# Patient Record
Sex: MALE | Race: WHITE | NOT HISPANIC OR LATINO | URBAN - METROPOLITAN AREA
[De-identification: names, ages, dates, MRNs, and addresses within clinical notes are randomized per-mention and may not be internally consistent; named-entity substitution may affect disease eponyms.]

---

## 2024-11-23 ASSESSMENT — ENCOUNTER SYMPTOMS
COUGH: 0
PALPITATIONS: 0
EYE DISCHARGE: 0
FEVER: 0
HEADACHES: 0
MYALGIAS: 0
DIARRHEA: 0
ABDOMINAL PAIN: 0
FATIGUE: 0
BLOOD IN STOOL: 0
NERVOUS/ANXIOUS: 0
WHEEZING: 0
FREQUENCY: 0
EYE REDNESS: 0
BRUISES/BLEEDS EASILY: 0
SHORTNESS OF BREATH: 0
RHINORRHEA: 0
CONSTIPATION: 0
POLYDIPSIA: 0
NAUSEA: 0
HEMATURIA: 0
SORE THROAT: 0
DYSURIA: 0
DYSPHORIC MOOD: 0
DIZZINESS: 0
ADENOPATHY: 0
POLYPHAGIA: 0
CHILLS: 0
ARTHRALGIAS: 0
VOMITING: 0

## 2024-11-24 NOTE — PROGRESS NOTES
"Subjective   Patient ID: Olivier Desir is a 34 y.o. male who presents for Annual Exam.    new pt-[facility]-last pcp, gi, others? Have not seen a doctor in 10+ years.,     Any forms to fill out? No   last physical- 10 years   last labs- cannot recall   is pt fasting? yes  Does pt want a flu shot? No   Last tdap-2018   Does pt still smoke? Vapes   If no when did he quit smoking?  10 years ago   When did he start smoking? 13 years old   Any other questions or concerns that you want to discuss today?   Deviated septum      Has a cleft lip/palate surgery when younger  Family history form    No care team member to display    HPI  Last labs->1yr  Due for labs- all    No results found for: \"CHOL\"  No results found for: \"TRIG\"  No results found for: \"HDL\"  No results found for: \"LDL\"  No results found for: \"TSH\"  No results found for: \"A1C\"  No components found for: \"POCA1C\"  No results found for: \"ALBUR\"  No components found for: \"POCALBUR\"      Other concerns: deviated septum-dry air gives open wounds-usually starts feb but has one now for 3wks    bps at home- none    ER/urgicare visits in the last year- none  Hospitalizations in the last year- none      FH colon ca-none  FH prostate ca-none      Exercise- job-  Diet-once a day  Body mass index is 28.11 kg/m².    last dental appt- 5yrs ago    BMs-regular  Sleep-hard to fall asleep but hard to stay asleep; no snoring or apnea; has tried melatonin  no cp, swelling, sob, abd pain, n/v/d/c, blood in stool or black stools  STI testing including hiv (age 15-65) and hep c screening (18-79)-declines        Immunization History   Administered Date(s) Administered    Tdap vaccine, age 7 year and older (BOOSTRIX, ADACEL) 01/02/2018         Flu shot-declines      fractures in lifetime-ankles  Anyone with osteoporosis in the family-none    FH heart attack, heart surgery-none  FH stroke-none    The ASCVD Risk score (Fredi DK, et al., 2019) failed to calculate for the following " reasons:    The 2019 ASCVD risk score is only valid for ages 40 to 79  Coronary Artery Calcium score:  This test is recommended for men 45 or older and women 55 or older without a history of heart disease and have 1 risk factor (high LDL cholesterol, low HDL cholesterol, high blood pressure, smoker (current or past), type 2 diabetes, IBD, lupus, RA, ankylosing spondylitis, psoriasis or family history of  heart disease <55yrs in dad, brother or child or <65yrs in mom, sister, or child.)       Review of Systems   Constitutional:  Negative for chills, fatigue and fever.   HENT:  Negative for congestion, ear pain, postnasal drip, rhinorrhea and sore throat.         Sore in nose   Eyes:  Negative for discharge, redness and visual disturbance.   Respiratory:  Negative for cough, shortness of breath and wheezing.    Cardiovascular:  Negative for chest pain, palpitations and leg swelling.   Gastrointestinal:  Negative for abdominal pain, blood in stool, constipation, diarrhea, nausea and vomiting.   Endocrine: Negative for cold intolerance, polydipsia, polyphagia and polyuria.   Genitourinary:  Negative for dysuria, frequency, genital sores, hematuria, penile pain, testicular pain and urgency.   Musculoskeletal:  Negative for arthralgias and myalgias.   Skin:  Negative for rash.   Neurological:  Negative for dizziness, syncope and headaches.   Hematological:  Negative for adenopathy. Does not bruise/bleed easily.   Psychiatric/Behavioral:  Negative for dysphoric mood. The patient is not nervous/anxious.        Objective   Visit Vitals  /84   Temp 36.7 °C (98 °F)      BP Readings from Last 3 Encounters:   11/25/24 127/84   02/27/22 136/84   01/26/22 117/83     Wt Readings from Last 3 Encounters:   11/25/24 81.8 kg (180 lb 6.4 oz)   02/27/22 77.1 kg (170 lb)   01/26/22 77.1 kg (170 lb)           Physical Exam  Vitals reviewed.   Constitutional:       General: He is not in acute distress.     Appearance: Normal  appearance. He is not ill-appearing.   HENT:      Head: Normocephalic.      Right Ear: Tympanic membrane, ear canal and external ear normal.      Left Ear: Tympanic membrane, ear canal and external ear normal.      Nose: Nose normal.      Mouth/Throat:      Mouth: Mucous membranes are moist.      Pharynx: Oropharynx is clear. No oropharyngeal exudate or posterior oropharyngeal erythema.   Eyes:      Extraocular Movements: Extraocular movements intact.      Conjunctiva/sclera: Conjunctivae normal.      Pupils: Pupils are equal, round, and reactive to light.   Cardiovascular:      Rate and Rhythm: Normal rate and regular rhythm.      Heart sounds: Normal heart sounds. No murmur heard.  Pulmonary:      Effort: Pulmonary effort is normal. No respiratory distress.      Breath sounds: Normal breath sounds. No wheezing, rhonchi or rales.   Abdominal:      General: Bowel sounds are normal. There is no distension.      Palpations: Abdomen is soft. There is no mass.      Tenderness: There is no abdominal tenderness.   Musculoskeletal:         General: No swelling or tenderness. Normal range of motion.      Cervical back: Normal range of motion and neck supple. No tenderness.      Right lower leg: No edema.      Left lower leg: No edema.   Lymphadenopathy:      Cervical: No cervical adenopathy.   Skin:     General: Skin is warm.      Findings: No rash.   Neurological:      General: No focal deficit present.      Mental Status: He is alert and oriented to person, place, and time.      Cranial Nerves: No cranial nerve deficit.   Psychiatric:         Mood and Affect: Mood normal.         Behavior: Behavior normal.       Assessment/Plan   Diagnoses and all orders for this visit:  Routine general medical examination at a health care facility  -     Comprehensive Metabolic Panel; Future  -     CBC and Auto Differential; Future  -     Lipid Panel; Future  -     TSH with reflex to Free T4 if abnormal; Future  Deviated septum  -      Referral to ENT; Future  BMI 28.0-28.9,adult  Sore in nose  -     mupirocin (Bactroban) 2 % ointment; Apply topically 2 times a day for 7 days. apply to affected area  Other orders  -     Follow Up In Primary Care - Health Maintenance; Future

## 2024-11-25 ENCOUNTER — APPOINTMENT (OUTPATIENT)
Dept: PRIMARY CARE | Facility: CLINIC | Age: 34
End: 2024-11-25
Payer: COMMERCIAL

## 2024-11-25 VITALS
HEIGHT: 67 IN | SYSTOLIC BLOOD PRESSURE: 127 MMHG | TEMPERATURE: 98 F | OXYGEN SATURATION: 95 % | BODY MASS INDEX: 28.31 KG/M2 | WEIGHT: 180.4 LBS | DIASTOLIC BLOOD PRESSURE: 84 MMHG

## 2024-11-25 DIAGNOSIS — Z00.00 ROUTINE GENERAL MEDICAL EXAMINATION AT A HEALTH CARE FACILITY: Primary | ICD-10-CM

## 2024-11-25 DIAGNOSIS — J34.89 SORE IN NOSE: ICD-10-CM

## 2024-11-25 DIAGNOSIS — R79.89 LOW SERUM THYROID STIMULATING HORMONE (TSH): Primary | ICD-10-CM

## 2024-11-25 DIAGNOSIS — J34.2 DEVIATED SEPTUM: ICD-10-CM

## 2024-11-25 LAB
NON-UH HIE A/G RATIO: 1.4
NON-UH HIE ALB: 4.3 G/DL (ref 3.4–5)
NON-UH HIE ALK PHOS: 61 UNIT/L (ref 45–117)
NON-UH HIE BASO COUNT: 0.05 X1000 (ref 0–0.2)
NON-UH HIE BASOS %: 0.7 %
NON-UH HIE BILIRUBIN, TOTAL: 0.4 MG/DL (ref 0.3–1.2)
NON-UH HIE BUN/CREAT RATIO: 20
NON-UH HIE BUN: 22 MG/DL (ref 9–23)
NON-UH HIE CALCIUM: 10.2 MG/DL (ref 8.7–10.4)
NON-UH HIE CALCULATED LDL CHOLESTEROL: 130 MG/DL (ref 60–130)
NON-UH HIE CALCULATED OSMOLALITY: 287 MOSM/KG (ref 275–295)
NON-UH HIE CHLORIDE: 108 MMOL/L (ref 98–107)
NON-UH HIE CHOLESTEROL: 212 MG/DL (ref 100–200)
NON-UH HIE CO2, VENOUS: 28 MMOL/L (ref 20–31)
NON-UH HIE CREATININE: 1.1 MG/DL (ref 0.6–1.1)
NON-UH HIE DIFF?: NO
NON-UH HIE EOS COUNT: 0.12 X1000 (ref 0–0.5)
NON-UH HIE EOSIN %: 1.9 %
NON-UH HIE FREE T4: 1.2 NG/DL (ref 0.89–1.76)
NON-UH HIE GFR AA: >60
NON-UH HIE GLOBULIN: 3 G/DL
NON-UH HIE GLOMERULAR FILTRATION RATE: >60 ML/MIN/1.73M?
NON-UH HIE GLUCOSE: 109 MG/DL (ref 74–106)
NON-UH HIE GOT: 17 UNIT/L (ref 15–37)
NON-UH HIE GPT: 24 UNIT/L (ref 10–49)
NON-UH HIE HCT: 50 % (ref 41–52)
NON-UH HIE HDL CHOLESTEROL: 44 MG/DL (ref 40–60)
NON-UH HIE HGB: 16.8 G/DL (ref 13.5–17.5)
NON-UH HIE INSTR WBC: 6.4
NON-UH HIE K: 5 MMOL/L (ref 3.5–5.1)
NON-UH HIE LYMPH %: 24.1 %
NON-UH HIE LYMPH COUNT: 1.54 X1000 (ref 1.2–4.8)
NON-UH HIE MCH: 29.2 PG (ref 27–34)
NON-UH HIE MCHC: 33.7 G/DL (ref 32–37)
NON-UH HIE MCV: 86.7 FL (ref 80–100)
NON-UH HIE MONO %: 8.8 %
NON-UH HIE MONO COUNT: 0.56 X1000 (ref 0.1–1)
NON-UH HIE MPV: 9.2 FL (ref 7.4–10.4)
NON-UH HIE NA: 142 MMOL/L (ref 135–145)
NON-UH HIE NEUTROPHIL %: 64.5 %
NON-UH HIE NEUTROPHIL COUNT (ANC): 4.14 X1000 (ref 1.4–8.8)
NON-UH HIE NUCLEATED RBC: 0 /100WBC
NON-UH HIE PLATELET: 278 X10 (ref 150–450)
NON-UH HIE RBC: 5.76 X10 (ref 4.7–6.1)
NON-UH HIE RDW: 12.8 % (ref 11.5–14.5)
NON-UH HIE TOTAL CHOL/HDL CHOL RATIO: 4.8
NON-UH HIE TOTAL PROTEIN: 7.3 G/DL (ref 5.7–8.2)
NON-UH HIE TRIGLYCERIDES: 188 MG/DL (ref 30–150)
NON-UH HIE TSH: 0.51 UIU/ML (ref 0.55–4.78)
NON-UH HIE WBC: 6.4 X10 (ref 4.5–11)

## 2024-11-25 PROCEDURE — 99385 PREV VISIT NEW AGE 18-39: CPT | Performed by: NURSE PRACTITIONER

## 2024-11-25 PROCEDURE — 1036F TOBACCO NON-USER: CPT | Performed by: NURSE PRACTITIONER

## 2024-11-25 PROCEDURE — 3008F BODY MASS INDEX DOCD: CPT | Performed by: NURSE PRACTITIONER

## 2024-11-25 RX ORDER — MUPIROCIN 20 MG/G
OINTMENT TOPICAL 2 TIMES DAILY
Qty: 22 G | Refills: 5 | Status: SHIPPED | OUTPATIENT
Start: 2024-11-25 | End: 2024-12-02

## 2024-11-25 ASSESSMENT — PATIENT HEALTH QUESTIONNAIRE - PHQ9
2. FEELING DOWN, DEPRESSED OR HOPELESS: NOT AT ALL
SUM OF ALL RESPONSES TO PHQ9 QUESTIONS 1 AND 2: 0
1. LITTLE INTEREST OR PLEASURE IN DOING THINGS: NOT AT ALL

## 2024-11-25 NOTE — PATIENT INSTRUCTIONS
See dentist  See ent dr Hewitt ointment for nose sores        Handouts given to pt:  physical handout        I recommend signing up for MyChart.    Labs- No appt needed:    You can use the lab in our building when fasting. The hrs are: Mon-Fri 7a-330p.  No Saturday hrs. Bring the paper order.   OR   [facility] Mon-Fri 7a-12p. No Saturday hrs. Bring the paper order.  OR   [facility] Mon-Fri 630a-530p or Sat 6:30a-12p. Bring the paper order  OR  [facility] Mon-FrI 7a-5p, Sat 8a-12p  [facility] Mon-Fri 730a-4p, Sat  8a-12p  [facility] [address] Mon-Thurs 630a-6p , Fri 630a-4p, Sat 8a-12p  [facility] [address] Mon-Fri 7a-630p and Sat. 7a-3p    Fasting is no food, drink, gum or mints other than water for 12 hrs.   Results will be back in 2-3 business days for most labs. It is always recommended for any orders (labs, xrays, ultrasounds,MRI, ct scan, procedures etc) to check with your insurance provider for expected costs or expenses to you.         You will get your results via phone from my medical assistant if you do not have MyChart.  OR  You will get your results via [email or web address]    If a result is urgent, I will call to speak to you.    Vaccines:  ---- flu vaccine-declines        General recommendations:  Exercise-cardio 4-5d/wk 30min each day  Diet-Breakfast-toast (my favorite Princess [employer]an Delighful Multigrain or Oskar's Killer Bread Good Seed thin-sliced)/bagel/English muffin-whole wheat flour as a 1st ingredient or cereal/oatmeal/granola bar-fiber 4g or more or protein like eggs or peanut butter; optional veggies  Lunch-protein, 1/2c carb or 2 slices bread, veg 1c  Dinner-protein, fist sized carb, veg 1c  Fruit 2 a day  Dairy 2 a day-milk, soy milk, almond milk, cheese, yogurt, cottage cheese  Snacks-Protein-hard boiled egg, nuts (walnuts/almonds/pecans/pistachios 1/4c), hummus, beef/deer jerky or meat sticks; vegetable, fruit, dairy-milk(1%, skim,  almond, soy)/cheese (not a lot of cheddar)/yogurt (Greek is best-my favorite Dannon Fruit on the Bottom Greek)/cottage cheese 2%; triscuits/ popcorn/wheat thins have a lot of fiber; follow serving size on bag/box/container  increase water  Limit alcohol to 1 drink per day for women and 2 drinks per day for men (1 drink=12oz beer or 5oz wine or 1 1/2oz liquor)  Calcium: 500mg 1 twice a day if age 50 and younger and 600mg 1 twice a day if over age 50 (calcium citrate can be taken without food)  Vitamin D: 800-5000 IU/day  Limit salt to <2300mg a day if age 50 and under and <1500mg a day if over age 50/have high bp or diabetes or kidney disease  Recommend folate for childbearing age women 0.4mg per day (can be found in a multivitamin)  Recommend 18mg/dL of iron a day if age 50 and under and 8mg/dL a day if over age 50; take on an empty stomach at bedtime  Use sunscreen   Wear seatbelt  Recommend safe sex practices: using condoms everytime you have sex, discuss with a new partner about their past partners/history of STDs/drug use, avoid drinking alcohol or using drugs as this increases the chance that you will participate in high-risk sex, for oral sex help protect your mouth by having your partner use a condom (male or female), women should not douche after sex, be aware of your partner's body and your body-look for signs of a sore, blister, rash, or discharge, and have regular exams and periodic tests for STDs.  No distracted driving  No driving when under influence of substances  Wear a seatbelt  Eye dr every 1-2yrs  Dentist every 6-12 mon  Tetanus shot every 10yrs  Recommend flu vaccine in the fall  Appt in  1 year for physical      I will communicate with you via [employer] regarding messages and results. If you need help with this, you can call the support line at [phone].    IT WAS A PLEASURE TO SEE YOU TODAY. THANK YOU FOR CHOOSING US FOR YOUR HEALTHCARE NEEDS.

## 2024-12-27 ENCOUNTER — APPOINTMENT (OUTPATIENT)
Dept: PRIMARY CARE | Facility: CLINIC | Age: 34
End: 2024-12-27
Payer: COMMERCIAL

## 2025-06-04 ENCOUNTER — APPOINTMENT (OUTPATIENT)
Dept: PRIMARY CARE | Facility: CLINIC | Age: 35
End: 2025-06-04
Payer: COMMERCIAL

## 2025-07-27 NOTE — PATIENT INSTRUCTIONS
Antibiotic  Zyrtec 1 a day x 2wk  If sore throat not better in 2wks, see ent       Keep nov appt    I will communicate with you via [employer] regarding messages and results. If you need help with this, you can call the support line at [phone].    IT WAS A PLEASURE TO SEE YOU TODAY. THANK YOU FOR CHOOSING US FOR YOUR HEALTHCARE NEEDS.

## 2025-07-27 NOTE — PROGRESS NOTES
Subjective   Patient ID: Olivier Desir is a 34 y.o. male who presents for No chief complaint on file..  Last physical: 11/25/24  Last labs-11/2024 Hdl normal.  Ldl high at 130. Goal <100. Decr fats and incr fiber.  Trigs high at 188. Goal <150. Decr carbs and sugars.  Sugar high at 109. Goal <100. Decr carbs and sugars for this too.  Make an appt with me to discuss how to lower this.  kidney function, liver function and electrolytes in the CMP (comprehensive metabolic panel) were normal.  Thyroid lab (TSH) slightly low. T4 normal. Recheck lab in 1mon. If still low I will refer to mariann holloway for hyperthyroid. No fasting or appt needed.  CBC (complete blood count) was normal which looks at infection and anemia markers.    Did pt see ent for deviated septum?  If yes name-  When did the ST start?  Any other symptoms with this?  Any other questions or concerns that pt wants to discuss today?    Poc a1c    HPI  ST    no cough, sob, wheezing, tight upper chest, fever, chills, muscle/jt pain, HA,  new loss of taste or smell, diarrhea, vomiting, nausea, abdominal pain, fatigue, weakness, red eye, rash, bruising, cyanosis, ear pain, ear pressure, runny nose, stuffy nose, post nasal drip, pain with deep breath, leg or foot swelling, calf pain  loss of appetite-  fluids-  has urinated at least 3 times in 24hrs  Seasonal allergies-  Selftxt-    No known exposure to COVID-19  No known exposure to strep  No known exposure to influenza  No one sick around the pt      Risk factors:  Chronic disease/comorbidities: none  not a healthcare worker  Age:  not 65yrs of age and older      No care team member to display     Review of Systems    Objective   There were no vitals taken for this visit.   BP Readings from Last 3 Encounters:   11/25/24 127/84   02/27/22 136/84   01/26/22 117/83     Wt Readings from Last 3 Encounters:   11/25/24 81.8 kg (180 lb 6.4 oz)   02/27/22 77.1 kg (170 lb)   01/26/22 77.1 kg (170 lb)       Physical  Exam    Assessment/Plan   {Assess/PlanSmartLinks:17480}      See patient instructions for full plan          Normal heart sounds. No murmur heard.  Pulmonary:      Effort: Pulmonary effort is normal. No respiratory distress.      Breath sounds: Normal breath sounds. No wheezing, rhonchi or rales.   Abdominal:      General: Bowel sounds are normal. There is no distension.      Palpations: Abdomen is soft. There is no mass.      Tenderness: There is no abdominal tenderness.     Musculoskeletal:         General: No swelling or tenderness. Normal range of motion.      Cervical back: Normal range of motion and neck supple. No tenderness.      Right lower leg: No edema.      Left lower leg: No edema.   Lymphadenopathy:      Cervical: No cervical adenopathy.     Skin:     General: Skin is warm.      Findings: No rash.     Neurological:      General: No focal deficit present.      Mental Status: He is alert and oriented to person, place, and time.      Cranial Nerves: No cranial nerve deficit.     Psychiatric:         Mood and Affect: Mood normal.         Behavior: Behavior normal.         Assessment/Plan   Diagnoses and all orders for this visit:  Acute non-recurrent maxillary sinusitis  -     amoxicillin-clavulanate (Augmentin) 875-125 mg tablet; Take 1 tablet (875 mg of amoxicillin) by mouth 2 times a day for 10 days.  Pharyngitis, unspecified etiology  -     Referral to ENT; Future  Elevated blood sugar  -     POCT glycosylated hemoglobin (Hb A1C) manually resulted        See patient instructions for full plan

## 2025-07-28 ENCOUNTER — APPOINTMENT (OUTPATIENT)
Dept: PRIMARY CARE | Facility: CLINIC | Age: 35
End: 2025-07-28
Payer: COMMERCIAL

## 2025-07-28 VITALS
BODY MASS INDEX: 26.2 KG/M2 | DIASTOLIC BLOOD PRESSURE: 54 MMHG | WEIGHT: 183 LBS | TEMPERATURE: 97.5 F | HEIGHT: 70 IN | SYSTOLIC BLOOD PRESSURE: 127 MMHG | HEART RATE: 68 BPM | OXYGEN SATURATION: 98 %

## 2025-07-28 DIAGNOSIS — J02.9 PHARYNGITIS, UNSPECIFIED ETIOLOGY: ICD-10-CM

## 2025-07-28 DIAGNOSIS — R73.9 ELEVATED BLOOD SUGAR: ICD-10-CM

## 2025-07-28 DIAGNOSIS — J01.00 ACUTE NON-RECURRENT MAXILLARY SINUSITIS: Primary | ICD-10-CM

## 2025-07-28 LAB — POC HEMOGLOBIN A1C: 5.1 % (ref 4.2–6.5)

## 2025-07-28 PROCEDURE — 99214 OFFICE O/P EST MOD 30 MIN: CPT | Performed by: NURSE PRACTITIONER

## 2025-07-28 PROCEDURE — 83036 HEMOGLOBIN GLYCOSYLATED A1C: CPT | Performed by: NURSE PRACTITIONER

## 2025-07-28 PROCEDURE — 3008F BODY MASS INDEX DOCD: CPT | Performed by: NURSE PRACTITIONER

## 2025-07-28 RX ORDER — AMOXICILLIN AND CLAVULANATE POTASSIUM 875; 125 MG/1; MG/1
875 TABLET, FILM COATED ORAL 2 TIMES DAILY
Qty: 20 TABLET | Refills: 0 | Status: SHIPPED | OUTPATIENT
Start: 2025-07-28 | End: 2025-08-07

## 2025-07-28 ASSESSMENT — ENCOUNTER SYMPTOMS
POLYPHAGIA: 0
EYE DISCHARGE: 0
VOMITING: 0
ADENOPATHY: 0
POLYDIPSIA: 0
WHEEZING: 0
DYSPHORIC MOOD: 0
HEMATURIA: 0
SHORTNESS OF BREATH: 0
NERVOUS/ANXIOUS: 0
SORE THROAT: 1
RHINORRHEA: 1
FREQUENCY: 0
ABDOMINAL PAIN: 0
BRUISES/BLEEDS EASILY: 0
FEVER: 0
FATIGUE: 0
MYALGIAS: 0
PALPITATIONS: 0
HEADACHES: 1
BLOOD IN STOOL: 0
CHILLS: 0
CONSTIPATION: 0
DIARRHEA: 0
DYSURIA: 0
NAUSEA: 0
COUGH: 0
DIZZINESS: 0
ARTHRALGIAS: 0
EYE REDNESS: 0

## 2025-08-20 ENCOUNTER — APPOINTMENT (OUTPATIENT)
Dept: OTOLARYNGOLOGY | Facility: CLINIC | Age: 35
End: 2025-08-20
Payer: COMMERCIAL

## 2025-11-26 ENCOUNTER — APPOINTMENT (OUTPATIENT)
Dept: PRIMARY CARE | Facility: CLINIC | Age: 35
End: 2025-11-26
Payer: COMMERCIAL